# Patient Record
Sex: FEMALE | Race: AMERICAN INDIAN OR ALASKA NATIVE
[De-identification: names, ages, dates, MRNs, and addresses within clinical notes are randomized per-mention and may not be internally consistent; named-entity substitution may affect disease eponyms.]

---

## 2021-07-28 ENCOUNTER — HOSPITAL ENCOUNTER (EMERGENCY)
Dept: HOSPITAL 5 - ED | Age: 41
Discharge: HOME | End: 2021-07-28
Payer: SELF-PAY

## 2021-07-28 VITALS — DIASTOLIC BLOOD PRESSURE: 87 MMHG | SYSTOLIC BLOOD PRESSURE: 137 MMHG

## 2021-07-28 DIAGNOSIS — Z98.890: ICD-10-CM

## 2021-07-28 DIAGNOSIS — Z79.899: ICD-10-CM

## 2021-07-28 DIAGNOSIS — F17.200: ICD-10-CM

## 2021-07-28 DIAGNOSIS — N63.0: Primary | ICD-10-CM

## 2021-07-28 DIAGNOSIS — R59.0: ICD-10-CM

## 2021-07-28 DIAGNOSIS — J45.909: ICD-10-CM

## 2021-07-28 PROCEDURE — 99282 EMERGENCY DEPT VISIT SF MDM: CPT

## 2021-07-28 PROCEDURE — 93005 ELECTROCARDIOGRAM TRACING: CPT

## 2021-07-28 NOTE — EMERGENCY DEPARTMENT REPORT
Chief Complaint: Medical Clearance


Stated Complaint: BREAST PAIN/JAW PAIN


Time Seen by Provider: 07/28/21 14:43





- HPI


History of Present Illness: 





Patient is a 41-year-old female presents emergency room with complaints of left 

breast swelling and pain that began 3 weeks ago.  She states her mother has a 

history of breast cancer.  She states that she has not had a mammogram in 

several years.  Patient states that she did have one episode of bloody discharge

from the nipple.  She denies any fever, nausea, vomiting, diarrhea, chills, 

cough, shortness of breath. no allergies to meds





- Exam


Vital Signs: 


                                   Vital Signs











  07/28/21





  12:46


 


Temperature 98.0 F


 


Pulse Rate 93 H


 


Respiratory 20





Rate 


 


Blood Pressure 116/85


 


O2 Sat by Pulse 92





Oximetry 











MSE screening note: 


Focused history and physical exam performed.


Due to findings the following was ordered:











ED Disposition for MSE


Clinical Impression: 


 Breast nodule, LAD (lymphadenopathy), axillary





Disposition: DC-01 TO HOME OR SELFCARE


Is pt being admited?: No


Does the pt Need Aspirin: No


Condition: Stable


Additional Instructions: 


Please follow-up with Dr. Michael, breast surgeon.  Please follow-up with your

primary care doctor.  Return to emergency room for any new or worsening 

symptoms.  It is very important that you follow-up.


Referrals: 


[facility] [Provider Group] - 2-3 Days


COREY MICHAEL MD [Staff Physician] - 2-3 Days


Time of Disposition: 14:45


Print Language: ENGLISH

## 2021-07-28 NOTE — EMERGENCY DEPARTMENT REPORT
ED General Adult HPI





- General


Chief complaint: Medical Clearance


Stated complaint: BREAST PAIN/JAW PAIN


Time Seen by Provider: 07/28/21 14:43


Source: patient


Mode of arrival: Ambulatory


Limitations: No Limitations





- History of Present Illness


Initial comments: 





Patient is a 41-year-old female presents emergency room with complaints of left 

breast swelling and pain that began 3 weeks ago.  She states her mother has a 

history of breast cancer.  She states that she has not had a mammogram in 

several years.  Patient states that she did have one episode of bloody discharge

from the nipple.  She denies any fever, nausea, vomiting, diarrhea, chills, 

cough, shortness of breath. no allergies to meds





- Related Data


                                  Previous Rx's











 Medication  Instructions  Recorded  Last Taken  Type


 


Ibuprofen [Motrin 600 MG tab] 600 mg PO Q8H PRN #20 tablet 07/28/21 Unknown Rx


 


traMADoL [Ultram 50 MG tab] 50 mg PO Q6HR PRN #12 tablet 07/28/21 Unknown Rx











                                    Allergies











Allergy/AdvReac Type Severity Reaction Status Date / Time


 


No Known Allergies Allergy   Unverified 07/28/21 12:43














ED Review of Systems


ROS: 


Stated complaint: BREAST PAIN/JAW PAIN


Other details as noted in HPI





Comment: All other systems reviewed and negative





ED Past Medical Hx





- Past Medical History


Hx Asthma: Yes





- Surgical History


Additional Surgical History: OVARY REMOVED





- Social History


Smoking Status: Current Every Day Smoker


Substance Use Type: None





- Medications


Home Medications: 


                                Home Medications











 Medication  Instructions  Recorded  Confirmed  Last Taken  Type


 


Ibuprofen [Motrin 600 MG tab] 600 mg PO Q8H PRN #20 tablet 07/28/21  Unknown Rx


 


traMADoL [Ultram 50 MG tab] 50 mg PO Q6HR PRN #12 tablet 07/28/21  Unknown Rx














ED Physical Exam





- General


Limitations: No Limitations


General appearance: alert, in no apparent distress





- Head


Head exam: Present: atraumatic, normocephalic





- Eye


Eye exam: Present: normal appearance





- ENT


ENT exam: Present: mucous membranes moist





- Respiratory


Respiratory exam: Absent: respiratory distress, accessory muscle use





- Neurological Exam


Neurological exam: Present: alert, oriented X3





- Psychiatric


Psychiatric exam: Present: normal affect, normal mood





- Skin


Skin exam: Present: warm, dry, other (chaperone: adrian, RN, pt has an enlarged

left breast, there is a firm 3 cm nodule, no erythema, no increased warmth, no 

nipple drainage, no peau d'orange, there is a small left axillary LAD )





ED Course


                                   Vital Signs











  07/28/21 07/28/21





  12:46 14:41


 


Temperature 98.0 F 98.5 F


 


Pulse Rate 93 H 86


 


Respiratory 20 20





Rate  


 


Blood Pressure 116/85 137/87


 


O2 Sat by Pulse 92 94





Oximetry  














ED Medical Decision Making





- Lab Data








                                   Vital Signs











  07/28/21 07/28/21





  12:46 14:41


 


Temperature 98.0 F 98.5 F


 


Pulse Rate 93 H 86


 


Respiratory 20 20





Rate  


 


Blood Pressure 116/85 137/87


 


O2 Sat by Pulse 92 94





Oximetry  














- Medical Decision Making





Patient is a 41-year-old female presents emergency room with complaints of left 

breast swelling and pain that began 3 weeks ago.  She states her mother has a 

history of breast cancer.  She states that she has not had a mammogram in 

several years.  Patient states that she did have one episode of bloody discharge

from the nipple.  She denies any fever, nausea, vomiting, diarrhea, chills, 

cough, shortness of breath. no allergies to meds.  Initial triage vitals with 

hypoxia which improved upon repeat in exam room.  Patient is not having chest 

pain or shortness of breath.  On exam: chaperone: DAVID campbell, pt has an 

enlarged left breast, there is a firm 3 cm nodule, no erythema, no increased 

warmth, no nipple drainage, no peau d'orange, there is a small left axillary 

LAD.  Given patient's family history of breast cancer, examination is concerning

for breast cancer.  No signs of mastitis, cellulitis, breast abscess at this 

time.  Patient given the appropriate resources.  Discussed in detail with 

patient the importance of outpatient follow-up for a mammogram.  Advised patient

Please follow-up with Dr. Judd, breast surgeon.  Please follow-up with your

primary care doctor.  Return to emergency room for any new or worsening 

symptoms.  It is very important that you follow-up.


Critical care attestation.: 


If time is entered above; I have spent that time in minutes in the direct care 

of this critically ill patient, excluding procedure time.








ED Disposition


Clinical Impression: 


 Breast nodule, LAD (lymphadenopathy), axillary





Disposition: DC-01 TO HOME OR SELFCARE


Is pt being admited?: No


Does the pt Need Aspirin: No


Condition: Stable


Additional Instructions: 


Please follow-up with Dr. Judd, breast surgeon.  Please follow-up with your

primary care doctor.  Return to emergency room for any new or worsening 

symptoms.  It is very important that you follow-up.


Prescriptions: 


Ibuprofen [Motrin 600 MG tab] 600 mg PO Q8H PRN #20 tablet


 PRN Reason: Pain


traMADoL [Ultram 50 MG tab] 50 mg PO Q6HR PRN #12 tablet


 PRN Reason: Pain , Severe (7-10)


Referrals: 


[facility] [Provider Group] - 2-3 Days


COREY JUDD MD [Staff Physician] - 2-3 Days


Print Language: ENGLISH

## 2021-07-30 NOTE — ELECTROCARDIOGRAPH REPORT
[facility]

                                       

Test Date:    2021               Test Time:    13:13:43

Pat Name:     LOS ROGERS             Department:   

Patient ID:   [id number]          Room:          

Gender:       F                        Technician:   PETE

:          1980               Requested By: CHELSIE ZHOU

Order Number: [id number]              Reading MD:   Jack Church

                                 Measurements

Intervals                              Axis          

Rate:         89                       P:            58

MD:           189                      QRS:          -48

QRSD:         109                      T:            23

QT:           397                                    

QTc:          483                                    

                           Interpretive Statements

Sinus rhythm

Probable left atrial enlargement

Inferior infarct, old

No previous ECG available for comparison

Electronically Signed On 2021 17:34:08 EDT by Jack Church

## 2024-03-13 ENCOUNTER — APPOINTMENT (RX ONLY)
Dept: URBAN - METROPOLITAN AREA CLINIC 46 | Facility: CLINIC | Age: 44
Setting detail: DERMATOLOGY
End: 2024-03-13

## 2024-03-13 DIAGNOSIS — L98.0 PYOGENIC GRANULOMA: ICD-10-CM

## 2024-03-13 DIAGNOSIS — B07.8 OTHER VIRAL WARTS: ICD-10-CM

## 2024-03-13 PROCEDURE — ? LIQUID NITROGEN

## 2024-03-13 PROCEDURE — 17110 DESTRUCTION B9 LES UP TO 14: CPT

## 2024-03-13 PROCEDURE — 11102 TANGNTL BX SKIN SINGLE LES: CPT | Mod: 59

## 2024-03-13 PROCEDURE — ? ADDITIONAL NOTES

## 2024-03-13 PROCEDURE — ? COUNSELING

## 2024-03-13 PROCEDURE — ? BIOPSY BY SHAVE METHOD

## 2024-03-13 ASSESSMENT — LOCATION DETAILED DESCRIPTION DERM
LOCATION DETAILED: LEFT ULNAR PALM
LOCATION DETAILED: NASAL TIP

## 2024-03-13 ASSESSMENT — LOCATION SIMPLE DESCRIPTION DERM
LOCATION SIMPLE: LEFT HAND
LOCATION SIMPLE: NOSE

## 2024-03-13 ASSESSMENT — LOCATION ZONE DERM
LOCATION ZONE: NOSE
LOCATION ZONE: HAND

## 2024-03-13 NOTE — PROCEDURE: LIQUID NITROGEN
Show Topical Anesthesia Variable?: Yes
Consent: The patient's consent was obtained including but not limited to risks of crusting, scabbing, blistering, scarring, darker or lighter pigmentary change, recurrence, incomplete removal and infection.
Medical Necessity Information: It is in your best interest to select a reason for this procedure from the list below. All of these items fulfill various CMS LCD requirements except the new and changing color options.
Post-Care Instructions: I reviewed with the patient in detail post-care instructions. Patient is to wear sunprotection, and avoid picking at any of the treated lesions. Pt may apply Vaseline to crusted or scabbing areas.
Detail Level: Detailed
Render Post-Care Instructions In Note?: no
Medical Necessity Clause: This procedure was medically necessary because the lesions that were treated were:
Spray Paint Text: The liquid nitrogen was applied to the skin utilizing a spray paint frosting technique.

## 2024-03-13 NOTE — PROCEDURE: ADDITIONAL NOTES
Render Risk Assessment In Note?: no
Detail Level: Simple
Additional Notes: Pt reports trauma to the skin in this area and then growth of lesion with bleeding that takes hours to stop. Bx with ED&C performed. Pt to RTC for further ED&C should lesion recur.